# Patient Record
Sex: FEMALE | Race: OTHER | HISPANIC OR LATINO
[De-identification: names, ages, dates, MRNs, and addresses within clinical notes are randomized per-mention and may not be internally consistent; named-entity substitution may affect disease eponyms.]

---

## 2019-11-20 ENCOUNTER — APPOINTMENT (OUTPATIENT)
Dept: ULTRASOUND IMAGING | Facility: CLINIC | Age: 34
End: 2019-11-20
Payer: MEDICAID

## 2019-11-20 PROCEDURE — 76641 ULTRASOUND BREAST COMPLETE: CPT | Mod: 50

## 2022-04-21 PROBLEM — Z00.00 ENCOUNTER FOR PREVENTIVE HEALTH EXAMINATION: Status: ACTIVE | Noted: 2022-04-21

## 2022-04-27 ENCOUNTER — NON-APPOINTMENT (OUTPATIENT)
Age: 37
End: 2022-04-27

## 2022-04-27 ENCOUNTER — APPOINTMENT (OUTPATIENT)
Dept: CARDIOLOGY | Facility: CLINIC | Age: 37
End: 2022-04-27
Payer: MEDICAID

## 2022-04-27 VITALS
BODY MASS INDEX: 32.98 KG/M2 | DIASTOLIC BLOOD PRESSURE: 62 MMHG | WEIGHT: 168 LBS | OXYGEN SATURATION: 99 % | HEIGHT: 60 IN | SYSTOLIC BLOOD PRESSURE: 104 MMHG | HEART RATE: 63 BPM

## 2022-04-27 VITALS — DIASTOLIC BLOOD PRESSURE: 60 MMHG | SYSTOLIC BLOOD PRESSURE: 108 MMHG

## 2022-04-27 DIAGNOSIS — Z78.9 OTHER SPECIFIED HEALTH STATUS: ICD-10-CM

## 2022-04-27 PROCEDURE — 93242 EXT ECG>48HR<7D RECORDING: CPT

## 2022-04-27 PROCEDURE — 99204 OFFICE O/P NEW MOD 45 MIN: CPT

## 2022-04-27 NOTE — ASSESSMENT
[FreeTextEntry1] : EKG in the office today shows sinus rhythm, unremarkable\par \par History of hyperlipidemia.  Check fasting lipids and CMP\par \par Atypical chest pain.  Coronary risk factors include hyperlipidemia, overweight: Evaluate with ETT and echocardiogram\par \par Palpitations: Start ZIO recording for 3 days.\par \par Follow-up after above test results\par \par Thank you for this referral and allowing me to participate in the care of this patient.  If I can be of any further help or  if you have any questions, please do not hesitate to contact me\par \par \par Sincerely,\par \par Manuel Ortiz MD, FACC, [facility]

## 2022-04-27 NOTE — REASON FOR VISIT
[FreeTextEntry1] : Griselda is a pleasant 37-year-old female with no significant past cardiovascular history.\par \par For the past 6 months or so, she has intermittent chest pain in the  retrosternal region lasting several minutes, spontaneous and random onset and characterized as sharp pain.  It is not exertional.  She denies exertional shortness of breath, pedal edema, PND, orthopnea\par \par She also has intermittent palpitations described as a skipping heartbeats.  She has no dizziness associated with it.

## 2022-05-09 PROCEDURE — 93244 EXT ECG>48HR<7D REV&INTERPJ: CPT

## 2022-05-23 ENCOUNTER — APPOINTMENT (OUTPATIENT)
Dept: CARDIOLOGY | Facility: CLINIC | Age: 37
End: 2022-05-23
Payer: MEDICAID

## 2022-05-23 PROCEDURE — 93015 CV STRESS TEST SUPVJ I&R: CPT

## 2022-05-23 PROCEDURE — 93306 TTE W/DOPPLER COMPLETE: CPT

## 2022-06-15 ENCOUNTER — APPOINTMENT (OUTPATIENT)
Dept: CARDIOLOGY | Facility: CLINIC | Age: 37
End: 2022-06-15
Payer: MEDICAID

## 2022-06-15 VITALS
TEMPERATURE: 97.3 F | OXYGEN SATURATION: 99 % | HEIGHT: 61 IN | HEART RATE: 84 BPM | WEIGHT: 167 LBS | SYSTOLIC BLOOD PRESSURE: 102 MMHG | BODY MASS INDEX: 31.53 KG/M2 | DIASTOLIC BLOOD PRESSURE: 58 MMHG

## 2022-06-15 PROCEDURE — 99214 OFFICE O/P EST MOD 30 MIN: CPT

## 2022-06-15 RX ORDER — AZITHROMYCIN 250 MG/1
250 TABLET, FILM COATED ORAL
Qty: 6 | Refills: 0 | Status: DISCONTINUED | COMMUNITY
Start: 2022-02-07

## 2022-06-15 RX ORDER — AMOXICILLIN 500 MG/1
500 TABLET, FILM COATED ORAL
Qty: 21 | Refills: 0 | Status: DISCONTINUED | COMMUNITY
Start: 2022-04-25

## 2022-06-15 RX ORDER — METHYLPREDNISOLONE 4 MG/1
4 TABLET ORAL
Qty: 21 | Refills: 0 | Status: DISCONTINUED | COMMUNITY
Start: 2022-02-07

## 2022-06-15 RX ORDER — IBUPROFEN 800 MG/1
800 TABLET, FILM COATED ORAL
Qty: 20 | Refills: 0 | Status: DISCONTINUED | COMMUNITY
Start: 2022-04-25

## 2022-06-15 NOTE — CARDIOLOGY SUMMARY
[de-identified] : Reviewed all testing today below:\par 4/27/2022: sinus rhythm, unremarkable [de-identified] : ETT 5/23/2022: Exercise for 10:16min 11 METS Good for age and gender. ST segment changes affected by motion artifact, most likely negative for ischemia. [de-identified] : 5/23/2022: EF 50% Mildly dilated LA. Mildly reduced low to normal LV systolic function. No seg wall motion abnormalities. LV diastolic dysfunction. Normal PASP.  [de-identified] : Labs: K+ 3.7 Creat 0.45 Ast 13 Alt 13 HDL 54 Total chol 191

## 2022-06-15 NOTE — PHYSICAL EXAM
[Well Developed] : well developed [Well Nourished] : well nourished [No Acute Distress] : no acute distress [Normal Conjunctiva] : normal conjunctiva [Normal Venous Pressure] : normal venous pressure [No Carotid Bruit] : no carotid bruit [Normal S1, S2] : normal S1, S2 [No Murmur] : no murmur [No Rub] : no rub [No Gallop] : no gallop [Clear Lung Fields] : clear lung fields [Good Air Entry] : good air entry [No Respiratory Distress] : no respiratory distress  [Normal Gait] : normal gait [No Edema] : no edema [No Rash] : no rash [Moves all extremities] : moves all extremities [Normal Speech] : normal speech [Alert and Oriented] : alert and oriented

## 2022-06-15 NOTE — ASSESSMENT
[FreeTextEntry1] : GRISELDA GUERRA is a 37 year old F who presents today with the following active issues: \par \par Reduced EF by Echo: Low normal LV systolic function. Will check Cardiac MRI to assess for scarring, potential cardiomyopathy.\par \par Stress testing and labs reviewed and noted above. \par \par History of hyperlipidemia.  . Reviewed weight loss, dietary changes, exercise routine. \par \par Palpitations: reviewed zio. Rare Ectopy noted. \par \par F/U after testing to review results.\par Discussed red flag symptoms, which would warrant sooner or emergent medical evaluation.\par Any questions and concerns were addressed and resolved. \par \par Sincerely,\par \par Negin Drake ANP-C\par Patients history, testing, and plan reviewed with supervising MD: Dr. Manuel Ortiz MD, [facility], [facility]

## 2022-06-15 NOTE — REASON FOR VISIT
[FreeTextEntry1] : Griselda is a pleasant 37-year-old female with no significant past cardiovascular history.\par \par Initial Visit was 4/27/2022. Today she presents for a follow up on her recent testing. In the interim there have not been any hospitalizations or procedures. \par For the past 6 months or so, she has intermittent chest pain in the  retrosternal region lasting several minutes, spontaneous and random onset and characterized as sharp pain.  It is not exertional. When she has the chest pain she does feel short of breath or like she cannot take a deep breath.  She denies exertional shortness of breath, pedal edema, PND, orthopnea.\par \par She also has intermittent palpitations described as a skipping heartbeats.  She has no dizziness associated with it.\par \par She is not on a formal exercise routine. She denies any alcohol intake or recent pregnancies.

## 2022-06-15 NOTE — REVIEW OF SYSTEMS
[Chest Discomfort] : chest discomfort [Palpitations] : palpitations [Negative] : Neurological [FreeTextEntry5] : see HPI

## 2022-06-30 ENCOUNTER — NON-APPOINTMENT (OUTPATIENT)
Age: 37
End: 2022-06-30

## 2022-07-28 ENCOUNTER — NON-APPOINTMENT (OUTPATIENT)
Age: 37
End: 2022-07-28

## 2022-08-04 ENCOUNTER — APPOINTMENT (OUTPATIENT)
Dept: CARDIOLOGY | Facility: CLINIC | Age: 37
End: 2022-08-04

## 2022-08-04 VITALS
HEIGHT: 61 IN | SYSTOLIC BLOOD PRESSURE: 92 MMHG | WEIGHT: 164 LBS | DIASTOLIC BLOOD PRESSURE: 66 MMHG | BODY MASS INDEX: 30.96 KG/M2 | OXYGEN SATURATION: 99 % | RESPIRATION RATE: 12 BRPM | TEMPERATURE: 97.5 F | HEART RATE: 77 BPM

## 2022-08-04 PROCEDURE — 99214 OFFICE O/P EST MOD 30 MIN: CPT

## 2022-08-04 NOTE — ASSESSMENT
[FreeTextEntry1] : GRISELDA GUERRA is a 37 year old F who presents today with the following active issues: \par \par Borderline  EF by Echo: Low normal LV systolic function.  Insurance company declined MRI.  Repeat echocardiogram was suggested.  We reviewed ETT and event recorder.\par \par History of hyperlipidemia.  . Reviewed weight loss, dietary changes, exercise routine.  Target LDL less than 100\par \par Palpitations: reviewed zio. Rare Ectopy noted. \par \par Borderline low blood pressure.  Increase oral intake of water\par \par Follow-up after repeat echocardiogram.\par \par Atypical chest pain is most likely musculoskeletal.  Instead of tramadol, as needed use of NSAIDs would be appropriate.\par \par Thank you for this referral and allowing me to participate in the care of this patient.  If I can be of any further help or  if you have any questions, please do not hesitate to contact me\par \par \par Sincerely,\par \par Manuel Ortiz MD, FACC, [facility]\par \par

## 2022-08-04 NOTE — CARDIOLOGY SUMMARY
[de-identified] : Reviewed all testing today below:\par 4/27/2022: sinus rhythm, unremarkable [de-identified] : ETT 5/23/2022: Exercise for 10:16min 11 METS Good for age and gender. ST segment changes affected by motion artifact, most likely negative for ischemia. [de-identified] : 5/23/2022: EF 50% Mildly dilated LA. Mildly reduced low to normal LV systolic function. No seg wall motion abnormalities. LV diastolic dysfunction. Normal PASP.  [de-identified] : Labs: K+ 3.7 Creat 0.45 Ast 13 Alt 13 HDL 54 Total chol 191

## 2022-08-04 NOTE — REASON FOR VISIT
[FreeTextEntry1] : Griselda is a pleasant 37-year-old female with no significant past cardiovascular history.\par \par Patient recently was evaluated at [facility] ER for atypical chest pain.  EKG, chest x-ray, cardiac troponin and other labs were unremarkable.  The chest pain seem to respond to tramadol.\par \par For the past 6 months or so, she has intermittent chest pain in the  retrosternal region lasting several minutes, spontaneous and random onset and characterized as sharp pain.  It is not exertional. When she has the chest pain she does feel short of breath or like she cannot take a deep breath.  She denies exertional shortness of breath, pedal edema, PND, orthopnea.  She is overweight\par \par She also has intermittent palpitations described as a skipping heartbeats.  She has no dizziness associated with it.\par \par She is not on a formal exercise routine. She denies any alcohol intake or recent pregnancies. \par \par Echo in May 2022 showed EF of 50%.  Grade 1 diastolic dysfunction.  Mild LAE.  Normal PASP.  Neck MRI was declined by insurance.  They recommended repeat echocardiogram.  \par husam On ETT, exercised 10: 60 minutes on Francisco protocol with no evidence of ischemia by chest pain or EKG.

## 2022-08-18 ENCOUNTER — APPOINTMENT (OUTPATIENT)
Dept: CARDIOLOGY | Facility: CLINIC | Age: 37
End: 2022-08-18

## 2022-08-18 VITALS
OXYGEN SATURATION: 99 % | BODY MASS INDEX: 31.15 KG/M2 | SYSTOLIC BLOOD PRESSURE: 102 MMHG | HEIGHT: 61 IN | TEMPERATURE: 97.3 F | DIASTOLIC BLOOD PRESSURE: 62 MMHG | HEART RATE: 66 BPM | WEIGHT: 165 LBS

## 2022-08-18 PROCEDURE — 93306 TTE W/DOPPLER COMPLETE: CPT

## 2022-08-18 PROCEDURE — 99213 OFFICE O/P EST LOW 20 MIN: CPT

## 2022-08-18 NOTE — PHYSICAL EXAM
[Well Developed] : well developed [Well Nourished] : well nourished [No Acute Distress] : no acute distress [Normal Venous Pressure] : normal venous pressure [No Carotid Bruit] : no carotid bruit [Normal S1, S2] : normal S1, S2 [No Murmur] : no murmur [No Rub] : no rub [No Gallop] : no gallop [Clear Lung Fields] : clear lung fields [Good Air Entry] : good air entry [No Respiratory Distress] : no respiratory distress  [Normal Gait] : normal gait [No Edema] : no edema [No Rash] : no rash [Moves all extremities] : moves all extremities [Normal Speech] : normal speech [Alert and Oriented] : alert and oriented

## 2022-08-18 NOTE — ASSESSMENT
[FreeTextEntry1] : GRISELDA GUERRA is a 37 year old F who presents today with the following active issues: \par \par Borderline  EF by Echo: Low normal LV systolic function.  Insurance company declined MRI.  \par Echo today showing: Normal EF as reported above. Will repeat in 1 year or sooner if new symptoms arise.\par \par History of hyperlipidemia.  . Reviewed weight loss, dietary changes, exercise routine.  Target LDL less than 100\par \par Palpitations: reviewed zio. Rare Ectopy noted. \par \par Atypical chest pain is most likely musculoskeletal.  Instead of tramadol, as needed use of NSAIDs would be appropriate.\par \par Thank you for this referral and allowing me to participate in the care of this patient.  If I can be of any further help or  if you have any questions, please do not hesitate to contact me\par \par RTO 1 year\par Discussed red flag symptoms, which would warrant sooner or emergent medical evaluation.\par Any questions and concerns were addressed and resolved. \par \par Sincerely,\par \par Negin Drake ANP-C\par Patients history, testing, and plan reviewed with supervising MD: Dr. Manuel Ortiz MD, FAC, [facility] \par \par

## 2022-08-18 NOTE — CARDIOLOGY SUMMARY
[de-identified] : Reviewed all testing today below:\par 7/28/2022: NSR 62 bpm Non-specific T wave changes\par 4/27/2022: sinus rhythm, unremarkable [de-identified] : ETT 5/23/2022: Exercise for 10:16min 11 METS Good for age and gender. ST segment changes affected by motion artifact, most likely negative for ischemia. [de-identified] : 8/18/2022: EF 60% mILD mr. Normal LA. Normal LV systolic function and no seg. wall motion abnormalities. No pericardial effusion. Normal PASP. \par 5/23/2022: EF 50% Mildly dilated LA. Mildly reduced low to normal LV systolic function. No seg wall motion abnormalities. LV diastolic dysfunction. Normal PASP.  [de-identified] : Labs: K+ 3.7 Creat 0.45 Ast 13 Alt 13 HDL 54 Total chol 191

## 2022-08-18 NOTE — REVIEW OF SYSTEMS
[Chest Discomfort] : chest discomfort [Negative] : Neurological [SOB] : no shortness of breath [Dyspnea on exertion] : not dyspnea during exertion [FreeTextEntry5] : see HPI

## 2022-08-18 NOTE — REASON FOR VISIT
[FreeTextEntry1] : Griselda is a pleasant 37-year-old female with no significant past cardiovascular history.\par \par  ID #[id number]\par \par Patient recently was evaluated at [facility] ER 7/28/2022 for atypical chest pain.  EKG, chest x-ray, cardiac troponin and other labs were unremarkable.  The chest pain seem to respond to tramadol.\par \par For the past 6 months or so, she has intermittent chest pain in the  retrosternal region lasting several minutes, spontaneous and random onset and characterized as sharp pain.  It is not exertional.  She now denies exertional shortness of breath, pedal edema, PND, orthopnea. Today she presents to review her recent echocardiogram. Previous Echo showed decreased EF at 50%. Her symptoms have improved overall but still has intermittent chest discomforts. She has tried tylenol which has not helped. She has not trialed NSAIDS.\par \par She also has intermittent palpitations described as a skipping heartbeats.  She has no dizziness associated with it. Recent Zio reported below with rare ectopy\par \par She is not on a formal exercise routine. She denies any alcohol intake or recent pregnancies. \par

## 2022-11-17 ENCOUNTER — APPOINTMENT (OUTPATIENT)
Dept: CARDIOLOGY | Facility: CLINIC | Age: 37
End: 2022-11-17

## 2023-02-02 ENCOUNTER — APPOINTMENT (OUTPATIENT)
Dept: CARDIOLOGY | Facility: CLINIC | Age: 38
End: 2023-02-02

## 2023-03-16 ENCOUNTER — APPOINTMENT (OUTPATIENT)
Dept: CARDIOLOGY | Facility: CLINIC | Age: 38
End: 2023-03-16
Payer: MEDICAID

## 2023-03-16 VITALS
WEIGHT: 176 LBS | BODY MASS INDEX: 33.23 KG/M2 | SYSTOLIC BLOOD PRESSURE: 112 MMHG | HEART RATE: 81 BPM | TEMPERATURE: 97.3 F | HEIGHT: 61 IN | OXYGEN SATURATION: 98 % | DIASTOLIC BLOOD PRESSURE: 70 MMHG

## 2023-03-16 DIAGNOSIS — R93.1 ABNORMAL FINDINGS ON DIAGNOSTIC IMAGING OF HEART AND CORONARY CIRCULATION: ICD-10-CM

## 2023-03-16 DIAGNOSIS — R00.2 PALPITATIONS: ICD-10-CM

## 2023-03-16 DIAGNOSIS — E78.5 HYPERLIPIDEMIA, UNSPECIFIED: ICD-10-CM

## 2023-03-16 PROCEDURE — 93306 TTE W/DOPPLER COMPLETE: CPT

## 2023-03-16 PROCEDURE — 99214 OFFICE O/P EST MOD 30 MIN: CPT

## 2023-03-16 NOTE — ASSESSMENT
[FreeTextEntry1] : GRISELDA GUERRA is a 37 year old F who presents today with the following active issues: \par \par Borderline  EF by Echo in May 2022: Low normal LV systolic function.  Insurance company declined MRI.  \par Second echo in August showed normal EF .  Follow-up echocardiogram today in the office also is unremarkable with normal EF.\par \par History of hyperlipidemia.  . Reviewed weight loss, dietary changes, exercise routine.  Target LDL less than 100\par \par Atypical chest pain was most likely musculoskeletal.  It has now resolved\par \par \par RTO 1 year\par \par Thank you for this referral and allowing me to participate in the care of this patient.  If I can be of any further help or  if you have any questions, please do not hesitate to contact me\par \par \par Sincerely,\par \par Manuel Ortiz MD, FACC, [facility]\par

## 2023-03-16 NOTE — PHYSICAL EXAM
[Well Developed] : well developed [Well Nourished] : well nourished [No Acute Distress] : no acute distress [Normal Venous Pressure] : normal venous pressure [No Carotid Bruit] : no carotid bruit [Normal S1, S2] : normal S1, S2 [No Murmur] : no murmur [No Rub] : no rub [No Gallop] : no gallop [Clear Lung Fields] : clear lung fields [Good Air Entry] : good air entry [Normal Gait] : normal gait [No Respiratory Distress] : no respiratory distress  [No Edema] : no edema [No Rash] : no rash [Moves all extremities] : moves all extremities [Normal Speech] : normal speech [Alert and Oriented] : alert and oriented

## 2023-03-16 NOTE — CARDIOLOGY SUMMARY
[de-identified] : Reviewed all testing today below:\par 7/28/2022: NSR 62 bpm Non-specific T wave changes\par 4/27/2022: sinus rhythm, unremarkable [de-identified] : ETT 5/23/2022: Exercise for 10:16min 11 METS Good for age and gender. ST segment changes affected by motion artifact, most likely negative for ischemia. [de-identified] : 8/18/2022: EF 60% mILD mr. Normal LA. Normal LV systolic function and no seg. wall motion abnormalities. No pericardial effusion. Normal PASP. \par 5/23/2022: EF 50% Mildly dilated LA. Mildly reduced low to normal LV systolic function. No seg wall motion abnormalities. LV diastolic dysfunction. Normal PASP.  [de-identified] : Labs: K+ 3.7 Creat 0.45 Ast 13 Alt 13 HDL 54 Total chol 191

## 2023-03-16 NOTE — REVIEW OF SYSTEMS
[SOB] : no shortness of breath [Dyspnea on exertion] : not dyspnea during exertion [Chest Discomfort] : chest discomfort [Negative] : Neurological [FreeTextEntry5] : see HPI

## 2023-07-31 ENCOUNTER — APPOINTMENT (OUTPATIENT)
Dept: RADIOLOGY | Facility: CLINIC | Age: 38
End: 2023-07-31
Payer: MEDICAID

## 2023-07-31 PROCEDURE — 72100 X-RAY EXAM L-S SPINE 2/3 VWS: CPT

## 2023-08-29 ENCOUNTER — APPOINTMENT (OUTPATIENT)
Dept: MRI IMAGING | Facility: CLINIC | Age: 38
End: 2023-08-29
Payer: MEDICAID

## 2023-08-29 PROCEDURE — 72148 MRI LUMBAR SPINE W/O DYE: CPT

## 2025-01-31 ENCOUNTER — APPOINTMENT (OUTPATIENT)
Dept: MAMMOGRAPHY | Facility: CLINIC | Age: 40
End: 2025-01-31
Payer: MEDICAID

## 2025-01-31 PROCEDURE — 77067 SCR MAMMO BI INCL CAD: CPT

## 2025-01-31 PROCEDURE — 77063 BREAST TOMOSYNTHESIS BI: CPT

## 2025-04-04 NOTE — REASON FOR VISIT
[FreeTextEntry1] : Griselda is a pleasant 37-year-old female with no significant past cardiovascular history.\par \par She was evaluated at [facility] ER 7/28/2022 for atypical chest pain.  EKG, chest x-ray, cardiac troponin and other labs were unremarkable.  The chest pain  responded to tramadol.\par \par Chest pains and palpitations have resolved.  She is able to ambulate significantly without any cardiac symptoms.  Denies  exertional shortness of breath, pedal edema, PND, orthopnea. \par \par Today she presents to review her echocardiogram which was done today -was normal EF and mostly unremarkable echo.\par \par She is not on a formal exercise routine. She denies any alcohol intake or recent pregnancies. \par  sudden onset